# Patient Record
(demographics unavailable — no encounter records)

---

## 2025-01-14 NOTE — PHYSICAL EXAM
[General Appearance - In No Acute Distress] : no acute distress [Normal Conjunctiva] : the conjunctiva exhibited no abnormalities [No Oral Pallor] : no oral pallor [Respiration, Rhythm And Depth] : normal respiratory rhythm and effort [Auscultation Breath Sounds / Voice Sounds] : lungs were clear to auscultation bilaterally [Bowel Sounds] : normal bowel sounds [Abdomen Tenderness] : non-tender [Cyanosis, Localized] : no localized cyanosis [] : no rash [Oriented To Time, Place, And Person] : oriented to person, place, and time [Not Palpable] : not palpable [No Precordial Heave] : no precordial heave was noted [Normal Rate] : normal [Rhythm Regular] : regular [Normal S1] : normal S1 [Normal S2] : normal S2 [No Gallop] : no gallop heard [I] : a grade 1 [2+] : left 2+ [No Abnormalities] : the abdominal aorta was not enlarged and no bruit was heard [No Pitting Edema] : no pitting edema present [FreeTextEntry1] : ambulates with a cane secondary to lumbar spinal stenosis [Apical Thrill] : no thrill palpable at the apex [Click] : no click [Pericardial Rub] : no pericardial rub [Right Carotid Bruit] : no bruit heard over the right carotid [Left Carotid Bruit] : no bruit heard over the left carotid

## 2025-01-14 NOTE — CARDIOLOGY SUMMARY
[de-identified] : 1/14/25 -sinus rhythm at a rate of 68 bpm.  Nonspecific RSR' pattern in leads V1-V2.  Nonspecific diminished voltage in leads V2-V6.  Nonspecific repolarization changes.

## 2025-01-14 NOTE — DISCUSSION/SUMMARY
[EKG obtained to assist in diagnosis and management of assessed problem(s)] : EKG obtained to assist in diagnosis and management of assessed problem(s) [FreeTextEntry1] : Mrs. Nunes has noted resolution of her previously reported symptoms of dyspnea on exertion associated with chest tightness precipitated by walking at a faster pace than usual, especially if she should walk after having consumed a meal, seemingly in response to having utilized Breo inhaler.  Note that follow-up coronary CT angiography performed for further evaluation of the symptoms on 5/30/2024 revealed coronary atherosclerosis without evidence for obstructive disease.  Her cardiac examination today is unremarkable.  Her blood pressure reading today is mildly elevated.  Her electrocardiogram today reveals sinus rhythm with a non-specific RSR' pattern in leads V1-V2, nonspecific diminished voltage in the precordial leads, and nonspecific repolarization changes, essentially unchanged from her previous office tracing, allowing for lead placement variation.  In an effort to more optimally control the patient's blood pressure, I am going to try adding a low dosage of an angiotensin receptor blocking agent, noting that she previously was unable to tolerate irbesartan and valsartan secondary to atypical "side effects".  Previously, she had developed a cough related to lisinopril.  I have electronically prescribed olmesartan 5 mg daily to the patient's pharmacy for her today, and I have asked her to call me if she should have any difficulty tolerating this medication.  I have instructed the patient to continue HCTZ 12.5 mg daily as well for the time being, and her blood pressure will be followed.  I have reviewed the findings of the echocardiogram of 8/11/2023 in detail with the patient today.  I am referring the patient for follow-up echocardiography at this point for reassessment of the degree of mitral regurgitation.  She is going to make arrangements to have this study performed through our office, and I will telephone her to discuss the findings, once the study has been completed.  I have reviewed the findings of the coronary CT angiogram of 5/30/2024 in detail with the patient today.  In view of the finding of coronary arteriosclerosis, I have advised her to continue taking aspirin 81 mg daily.  I again recommended statin therapy, however, she again declined this recommendation, in view of having experienced intolerance to multiple statin agents in the past.  She does not wish to consider injection therapy with Repatha or Praluent at this time.  I have again explained the mechanism of vasovagal syncope to the patient today. The potential triggers of vagally-mediated episodes were explained to the patient, and she was advised to assume a supine position if she suspects one of these episodes is developing. In addition, I have advised patient to endeavor to maintain herself in a well-hydrated state, in an effort to avert these episodes.  I have reviewed the findings of the blood test report of 9/17/2022 in detail with the patient today. 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 Statin therapy was again discussed with her, however, she continues to decline being treated with statin therapy.  The importance of dietary modification (including following a sodium-restricted diet) and weight loss was again emphasized to the patient today.  I find no cardiac contraindication to the patient undergoing the EGD as scheduled on 2/4/2024, under routine sedation and monitoring conditions.  If preferable by the gastroenterologist, Dr. Ibarra, aspirin may be held for up to 1 week prior to the procedure.  I have asked the patient to call me if she should have any questions or problems pertaining to these matters, and especially if she should experience any concerning symptoms.  I have otherwise asked her to return to the office for follow-up cardiac evaluation and blood pressure reassessment in 3 months, provided she remains clinically stable in the interim. Female

## 2025-01-14 NOTE — HISTORY OF PRESENT ILLNESS
[FreeTextEntry1] : Mrs. Reshma Nunes presented to the office today for follow-up cardiac evaluation. I last evaluated the patient in the office on 7/15/2024.  The patient is a 78-year-old female with a history of coronary arteriosclerosis without obstructive disease (coronary CTA 10/26/2022), mild mitral regurgitation, vasovagal syncope, atypical chest discomfort, hypertension, a dyslipidemia, GERD, lumbosacral spinal stenosis, and COVID-19 viral infection (2022 - mild viral syndrome associated with bronchitis, requiring treatment with steroids and albuterol nebulizer therapy), asthmatic bronchitis, and pulmonary nodules.  The patient has been doing well from a cardiac symptomatic standpoint since her previous visit on 7/15/2024.  Specifically, she does not report having experienced chest discomfort in association with her activities.  She has not noted dyspnea on exertion.  She has not noted orthopnea or paroxysmal nocturnal dyspnea.  She has not noted any appreciable change in her usual degree of mild lower extremity swelling.  She has not experienced any episodes of palpitations, presyncope or syncope.  At the time of a previous visit here on 2024, the patient reported having recently been experiencing an increased degree of dyspnea on exertion associated with chest tightness, precipitated by walking at a faster pace than usual, and especially if she should walk after having consumed a meal.  I referred her for follow-up coronary CT angiography, which was performed on 2024, revealing coronary atherosclerosis without obstructive disease.  The patient subsequently followed up with her pulmonologist, Dr. Allen, who recommended Breo inhaler, as well as albuterol inhaler as needed.  The patient initially noted a favorable clinical response to utilizing the Breo inhaler, specifically noting significant improvement in her symptoms of chest tightness and dyspnea on exertion.  She then developed "a sore throat", which resolved after she discontinued the Breo inhaler on her own.  She followed up with Dr. Allen on 2024, at which time her pulmonary function studies were normal and she was clinically improved.  She was advised to remain off of Breo inhaler, and to utilize albuterol inhaler on an as needed basis.  She is going to be following up with the pulmonologist on 2025.  The patient is scheduled to undergo EGD on 2024 for further evaluation of abdominal discomfort and nausea, to be performed by Dr. Sixto Ibarra.  The patient reports having discontinued Toprol-XL 12.5 mg daily on 2023 secondary to having experienced fatigue and depression, which she attributed to this medication.  She has noted improvement in her degree of fatigue and has not been feeling depressed since the Toprol-XL was discontinued.  As far as risk factors for coronary artery disease are concerned, the patient has a history of hypertension. She has a history of a dyslipidemia, however, she has been unable to tolerate therapy with Zocor, secondary to developing discomfort in her hips and lower extremities. She states that she discontinued the Zocor, and the symptoms took approximately one year before resolving. She has declined being started on any other statin agents since that time. She denies having a history of diabetes. She denies a history of cigarette smoking. She does not have a known immediate family history of premature coronary artery disease.  Laboratory studies performed on 2022 revealed cholesterol 232, triglycerides 142, HDL 58, and calculated .  The liver chemistries were normal.  The BUN and creatinine were 11 and 0.69, respectively.  The potassium level was 4.2.  The glucose level was 99.  The hemoglobin and hematocrit were 13.8 and 40.1, respectively.  The TSH level was 1.35.  A  24-hour ambulatory blood pressure monitoring study performed from 10/27/21 - 10/28/21 revealed the average reading to be 132/75 with 36% of systolic readings being in the elevated range and 3% of diastolic readings being in the elevated range.  Coronary CT angiography most recently performed on 2024 revealed the total coronary artery calcium score to be 21(LM= 0, LAD= 4, Circ= 1, RCA= 16).  Minimal to mild disease was noted secondary to mixed plaque involving the proximal portion of the LAD, the mid-portion of the LAD, the distal portion of the LAD, a second diagonal branch of the LAD, the proximal and mid-portions of the circumflex artery, the first obtuse marginal branch of the circumflex artery, and the proximal, mid-portion, and distal portion of the RCA.  Pharmacological nuclear stress testing performed on 10/5/2022 was positive for the inducement of dizziness, nausea, and chest tightness.  The study was negative for the inducement of electrocardiographic evidence of myocardial ischemia or significant arrhythmias.  The cardiac imaging portion of the study revealed normal left ventricular myocardial perfusion and systolic function.  Echocardiography most recently performed on 2023 revealed normal cardiac chamber sizes with mild concentric left ventricular hypertrophy.  Left ventricular systolic function was normal, with an estimated ejection fraction of 60%.  Mild left ventricular diastolic dysfunction was demonstrated.  Mild to moderate mitral regurgitation and mild tricuspid regurgitation were demonstrated, without evidence of pulmonary hypertension.  Carotid artery Doppler testing performed on 1/15/15 did not demonstrate atherosclerosis formation or any significant stenoses.  Previous History:  The patient was vacationing in [place] in 2022.  During a tour of the [place] on a particularly hot and humid day, the patient elected to sit rather than take the tour, as she felt mildly dyspneic, although not to the point where she felt the need to use her albuterol inhaler.  A little later that day, while walking, she noted the development of chest heaviness/tightness associated with dyspnea.  She rested on a bench for a few minutes and these symptoms eased.  She then resumed walking, and the symptoms recurred, again easing within a few minutes of rest.  She continued this pattern until she returned to her hotel, at which point she used her albuterol inhaler and rested for half an hour, and then felt better.  Later that evening, the patient noted dyspnea on exertion in association with walking and climbing stairs again.  Over the next few days in [place], the symptoms did not recur.  She returned home on 9/15/2022 and consulted with her PCP, Dr. Millan, on 2022.  At that point, Singulair was discontinued, and the patient subsequently felt  "much better".  She subsequently noted that her "mind became clearer", her memory improved, and she became less dyspneic.  Note that the patient underwent pharmacological nuclear stress testing on 10/5/2022 in our office, which was positive for the inducement of dizziness, nausea, and vaguely having described chest discomfort.  The study was negative for the development of electrocardiographic evidence of myocardial ischemia or significant arrhythmias.  The cardiac imaging portion of the study revealed normal left ventricular myocardial perfusion and systolic function.  Follow-up coronary CT angiography performed on 10/26/2022 revealed a normal-sized heart with a total calcium score of 5.  Angiography revealed the left main coronary artery to be patent, mixed calcified and noncalcified plaque involving the midportion of the left anterior descending artery with minimal luminal narrowing, a patent left circumflex artery, and a dominant right coronary artery with calcified plaque within the proximal portion resulting in minimal luminal narrowing.  As an incidental finding, pulmonary nodules were noted, for which the patient consulted with a pulmonologist, Dr. Allen, who referred the patient for a follow-up chest CT scan for reassessment (performed on 2023, interpreted as revealing multiple pulmonary nodules with a stable appearance).  The patient presented to the emergency room at [facility] on 2021 for further evaluation of hypertension associated with a headache, possibly related to having recently been treated with a course of steroids for hives.  A CT scan of the head was reportedly unrevealing.  The patient was treated with intravenous labetalol and her blood pressure improved.  She was released from the emergency room.  She telephoned me on 2021, complaining of elevated blood pressure readings at home as well as a headache.  I prescribed labetalol 100 mg twice daily.  She found that the labetalol lowered her blood pressure "too much", and she felt "dizzy".  The labetalol was discontinued on 2021.  The patient called on 2021 asking if she could increase her hydrochlorothiazide from 12.5 mg once daily to twice daily in an effort to better control her blood pressure.  She subsequently discontinued the higher dosage of hydrochlorothiazide after 1 day, secondary to developing dizziness.  At the time of a follow-up visit here on 2021, the patient reported having predominantly been exhibiting elevated blood pressure readings at home in the mornings, for which Toprol-XL 25 mg at bedtime was initiated.  At the time of a follow-up visit on 2021, the patient stated thaThe patient was vacationing in [place] in 2022.  During a tour of the [place] on a particularly hot and humid day, the patient elected to sit rather than take the tour, as she felt mildly dyspneic, although not to the point where she felt the need to use her albuterol inhaler.  A little later that day, while walking, she noted the development of chest heaviness/tightness associated with dyspnea.  She rested on a bench for a few minutes and these symptoms eased.  She then resumed walking, and the symptoms recurred, again easing within a few minutes of rest.  She continued this pattern until she returned to her hotel, at which point she used her albuterol inhaler and rested for half an hour, and then felt better.  Later that evening, the patient noted dyspnea on exertion in association with walking and climbing stairs again.  Over the next few days in [place], the symptoms did not recur.  She returned home on 9/15/2022 and consulted with her PCP, Dr. Millan, on 2022.  At that point, Singulair was discontinued, and the patient subsequently felt  "much better".  She subsequently noted that her "mind became clearer", her memory improved, and she became less dyspneic.  Note that the patient underwent pharmacological nuclear stress testing on 10/5/2022 in our office, which was positive for the inducement of dizziness, nausea, and vaguely having described chest discomfort.  The study was negative for the development of electrocardiographic evidence of myocardial ischemia or significant arrhythmias.  The cardiac imaging portion of the study revealed normal left ventricular myocardial perfusion and systolic function.  Follow-up coronary CT angiography performed on 10/26/2022 revealed a normal-sized heart with a total calcium score of 5.  Angiography revealed the left main coronary artery to be patent, mixed calcified and noncalcified plaque involving the midportion of the left anterior descending artery with minimal luminal narrowing, a patent left circumflex artery, and a dominant right coronary artery with calcified plaque within the proximal portion resulting in minimal luminal narrowing.  As an incidental finding, pulmonary nodules were noted, for which the patient consulted with a pulmonologist, Dr. Allen, who referred the patient for a follow-up chest CT scan for reassessment (performed on 2023, interpreted as revealing multiple pulmonary nodules with a stable appearance).t her morning blood pressure readings had continued to predominantly run in an elevated range, for which the dosage of Toprol-XL was increased to 25 mg twice daily.  The patient stated that she has been feeling fatigued in the afternoon, often needing to take a nap, since the dosage of Toprol-XL was increased, despite the dosage having been changed from Toprol-XL 25 mg twice daily to 50 mg at bedtime at the time of a follow-up visit here on 2021.  At the time of a previous visit here on 18, I instructed the patient to reduce the dosage of HCTZ from 25 mg daily to 12.5 mg every other day, in view of the patient having experienced 3 vagally-mediated syncopal episodes over the preceding several months. She then noted her home blood pressure readings to have been predominantly elevated (120's to 150's over 70's to 90's), as well as an increased degree of lower extremity swelling.  At the time of a follow-up visit here on 18, I instructed her to increase the dosage of HCTZ to 12.5 mg once daily, in an effort to more optimally control her blood pressure. She has been tolerating the increased dosage of HCTZ, and she has not experienced recurrence of presyncope/syncope since the dosage was increased to 12.5 mg once daily.  In 2018, while on a cruise ship, the patient awakened during the night when abdominal discomfort, a sensation of needing to have a bowel movement, and nausea. She went into the bathroom, and promptly experienced a brief syncopal episode. Her  came into the bathroom, and reports that the patient regained consciousness within a minute. The patient vomited and had a bowel movement while on the floor during this period of time. She was brought to the [facility], where she was treated with intravenous hydration and an antiemetic agent. She went back to her room and went to sleep, and felt fine when she awakened the following day. Since that time, she has experienced recurrence of syncope on 2 occasions, with both of these episodes having occurred at night after she developed abdominal discomfort and went to the bathroom to pass a bowel movement. One episode occurred on 18, for which the patient went to the emergency room at [facility] for further evaluation. Her evaluation, including blood testing, chest x-ray, electrocardiogram, and a non-contrast CT scan of the head were all unrevealing. She does report, however, that she was told of having a low blood pressure reading during the emergency room visit. The most recent episode occurred on 18. Fortunately, the patient did not sustain any serious injury with any of these syncopal episodes.  At the time of a previous visit here on 2/23/15, I started the patient on Avapro 150 mg daily, in an effort to control her blood pressure, noting that she previously did not tolerate Diovan secondary to the development of right hip and ankle discomfort and losartan secondary to GI intolerance. Previously, she had developed a cough related to lisinopril, which had been prescribed by her internist. The patient also noted feeling extremely fatigued, which she attributed to the Avapro. The Avapro was discontinued at the time of a follow up visit here on 3/30/50  At the time of a previous visit here on 14, the patient reported experiencing a cough, which I suspected was related to having recently been started by her internist on lisinopril. I instructed her to discontinue the lisinopril, and prescribed losartan as an alternative antihypertensive medication. The patient stated that the cough resolved after the lisinopril was discontinued, however, she telephoned me within a few days, reporting that she was experiencing stomach intolerance to the losartan. I instructed her to discontinue the losartan at that point, and I prescribed valsartan as an alternative antihypertensive medication. She states that she developed discomfort involving the right hip and the right ankle after this medication was initiated, and the medication was discontinued at the time of a follow-up visit here on 1/21/15.  As previously stated, the patient, the patient was started on Avapro on 2/23/15, however, developed fatigue, which she attributed to this medication, and the medication was subsequently discontinued.  Past medical/surgical history according to the patient is significant for a right ankle injury (torn ligament) a few years ago, uterine fibroids resected transvaginally, tubal ligation, 3 pregnancies (one voluntary  and 2 full-term pregnancies delivered vaginally), and laser surgical procedures for treatment of retinal tears.

## 2025-01-23 NOTE — PROCEDURE
[FreeTextEntry1] :  Prior exams reviewed:  ora: normal, improved.   PFT: Normal spirometry.  Lung volumes normal. DLCO normal.  improved from prior.  	 EXAM: [id number] - CT CHEST  - ORDERED BY: LEANA FRANCO   PROCEDURE DATE:  02/05/2024    INTERPRETATION:  Clinical information: Follow-up examination. Exam is compared to cardiac CT scan of 10/26/2022.  CT scan of the chest was obtained without administration of intravenous contrast.  No hilar or mediastinal adenopathy is noted.  Heart is normal in size. No pericardial effusion is noted.  No endobronchial lesions are noted. 0.2 cm nodule in the left lower lobe is unchanged when compared to previous exam. Calcified nodule is noted in the right lower lobe. A few linear opacities representing atelectasis are scattered within both lungs. No pleural effusions are noted.  Below the diaphragm, visualized portions of the abdomen are unremarkable.  Degenerative changes of the spine are noted.  IMPRESSION: Stable tiny nodule in the left lower lobe when compared to previous exam.  --- End of Report ---       ANITHA RYDER MD; Attending Radiologist This document has been electronically signed. Feb 12 2024  1:18PM PFT: Normal spirometry.  Lung volumes normal. DLCO normal.   ct scans reviewed see chart, [facility].

## 2025-01-23 NOTE — HISTORY OF PRESENT ILLNESS
[TextBox_4] : doing well off breo only on prn albuterol and doesn't really need it no resp complaints

## 2025-04-22 NOTE — HISTORY OF PRESENT ILLNESS
[FreeTextEntry1] : Mrs. Reshma Nunes presented to the office today for follow-up cardiac evaluation. I last evaluated the patient in the office on 2025.  The patient is a 78-year-old female with a history of coronary arteriosclerosis without obstructive disease (coronary CTA 10/26/2022), mild mitral regurgitation, vasovagal syncope, atypical chest discomfort, hypertension, a dyslipidemia, GERD, lumbosacral spinal stenosis, and COVID-19 viral infection (2022 - mild viral syndrome associated with bronchitis, requiring treatment with steroids and albuterol nebulizer therapy), asthmatic bronchitis, and pulmonary nodules.  At the time of the patient's previous visit here on 2025, I added olmesartan 5 mg daily in an effort to more optimally control the patient's blood pressure.  She has been tolerating this medication thus far and has noted her blood pressure readings to be normal at a subsequent visit with her pulmonologist on 2025.  She has remained on HCTZ 12.5 mg daily as well for blood pressure management.  The patient has been doing well from a cardiac symptomatic standpoint since her previous visit on 2025.  Specifically, she does not report having experienced chest discomfort in association with her activities.  She has not noted dyspnea on exertion.  She has not noted orthopnea or paroxysmal nocturnal dyspnea.  She has not noted any appreciable change in her usual degree of mild lower extremity swelling.  She has not experienced any episodes of palpitations, presyncope or syncope.  At the time of a previous visit here on 2024, the patient reported having recently been experiencing an increased degree of dyspnea on exertion associated with chest tightness, precipitated by walking at a faster pace than usual, and especially if she should walk after having consumed a meal.  I referred her for follow-up coronary CT angiography, which was performed on 2024, revealing coronary atherosclerosis without obstructive disease.  The patient subsequently followed up with her pulmonologist, Dr. Allen, who recommended Breo inhaler, as well as albuterol inhaler as needed.  The patient initially noted a favorable clinical response to utilizing the Breo inhaler, specifically noting significant improvement in her symptoms of chest tightness and dyspnea on exertion.  She then developed "a sore throat", which resolved after she discontinued the Breo inhaler on her own.  She followed up with Dr. Allen on 2024, at which time her pulmonary function studies were normal and she was clinically improved.  She was advised to remain off of Breo inhaler, and to utilize albuterol inhaler on an as needed basis.  She most recently followed up with the pulmonologist on 2025.  The patient reports having discontinued Toprol-XL 12.5 mg daily on 2023 secondary to having experienced fatigue and depression, which she attributed to this medication.  Review of systems is significant for the patient having recently been suffering from recurrent vertigo.  As far as risk factors for coronary artery disease are concerned, the patient has a history of hypertension. She has a history of a dyslipidemia, however, she has been unable to tolerate therapy with Zocor, secondary to developing discomfort in her hips and lower extremities. She states that she discontinued the Zocor, and the symptoms took approximately one year before resolving. She has declined being started on any other statin agents since that time. She denies having a history of diabetes. She denies a history of cigarette smoking. She does not have a known immediate family history of premature coronary artery disease.  Laboratory studies performed on 2022 revealed cholesterol 232, triglycerides 142, HDL 58, and calculated .  The liver chemistries were normal.  The BUN and creatinine were 11 and 0.69, respectively.  The potassium level was 4.2.  The glucose level was 99.  The hemoglobin and hematocrit were 13.8 and 40.1, respectively.  The TSH level was 1.35.  A  24-hour ambulatory blood pressure monitoring study performed from 10/27/21 - 10/28/21 revealed the average reading to be 132/75 with 36% of systolic readings being in the elevated range and 3% of diastolic readings being in the elevated range.  Coronary CT angiography most recently performed on 2024 revealed the total coronary artery calcium score to be 21(LM= 0, LAD= 4, Circ= 1, RCA= 16).  Minimal to mild disease was noted secondary to mixed plaque involving the proximal portion of the LAD, the mid-portion of the LAD, the distal portion of the LAD, a second diagonal branch of the LAD, the proximal and mid-portions of the circumflex artery, the first obtuse marginal branch of the circumflex artery, and the proximal, mid-portion, and distal portion of the RCA.  Pharmacological nuclear stress testing performed on 10/5/2022 was positive for the inducement of dizziness, nausea, and chest tightness.  The study was negative for the inducement of electrocardiographic evidence of myocardial ischemia or significant arrhythmias.  The cardiac imaging portion of the study revealed normal left ventricular myocardial perfusion and systolic function.  Echocardiography most recently performed on 2025 revealed normal cardiac chamber sizes with normal left ventricular wall thickness.  Left ventricular systolic function was normal, with a calculated ejection fraction of 61%.  Normal left ventricular diastolic function was demonstrated.  Mild to moderate mitral regurgitation was demonstrated.  Mild tricuspid regurgitation was demonstrated, without evidence of pulmonary hypertension.  Carotid artery Doppler testing performed on 1/15/15 did not demonstrate atherosclerosis formation or any significant stenoses.  Previous History:  The patient was vacationing in [place] in 2022.  During a tour of the [place] on a particularly hot and humid day, the patient elected to sit rather than take the tour, as she felt mildly dyspneic, although not to the point where she felt the need to use her albuterol inhaler.  A little later that day, while walking, she noted the development of chest heaviness/tightness associated with dyspnea.  She rested on a bench for a few minutes and these symptoms eased.  She then resumed walking, and the symptoms recurred, again easing within a few minutes of rest.  She continued this pattern until she returned to her hotel, at which point she used her albuterol inhaler and rested for half an hour, and then felt better.  Later that evening, the patient noted dyspnea on exertion in association with walking and climbing stairs again.  Over the next few days in [place], the symptoms did not recur.  She returned home on 9/15/2022 and consulted with her PCP, Dr. Millan, on 2022.  At that point, Singulair was discontinued, and the patient subsequently felt  "much better".  She subsequently noted that her "mind became clearer", her memory improved, and she became less dyspneic.  Note that the patient underwent pharmacological nuclear stress testing on 10/5/2022 in our office, which was positive for the inducement of dizziness, nausea, and vaguely having described chest discomfort.  The study was negative for the development of electrocardiographic evidence of myocardial ischemia or significant arrhythmias.  The cardiac imaging portion of the study revealed normal left ventricular myocardial perfusion and systolic function.  Follow-up coronary CT angiography performed on 10/26/2022 revealed a normal-sized heart with a total calcium score of 5.  Angiography revealed the left main coronary artery to be patent, mixed calcified and noncalcified plaque involving the midportion of the left anterior descending artery with minimal luminal narrowing, a patent left circumflex artery, and a dominant right coronary artery with calcified plaque within the proximal portion resulting in minimal luminal narrowing.  As an incidental finding, pulmonary nodules were noted, for which the patient consulted with a pulmonologist, Dr. Allen, who referred the patient for a follow-up chest CT scan for reassessment (performed on 2023, interpreted as revealing multiple pulmonary nodules with a stable appearance).  The patient presented to the emergency room at [facility] on 2021 for further evaluation of hypertension associated with a headache, possibly related to having recently been treated with a course of steroids for hives.  A CT scan of the head was reportedly unrevealing.  The patient was treated with intravenous labetalol and her blood pressure improved.  She was released from the emergency room.  She telephoned me on 2021, complaining of elevated blood pressure readings at home as well as a headache.  I prescribed labetalol 100 mg twice daily.  She found that the labetalol lowered her blood pressure "too much", and she felt "dizzy".  The labetalol was discontinued on 2021.  The patient called on 2021 asking if she could increase her hydrochlorothiazide from 12.5 mg once daily to twice daily in an effort to better control her blood pressure.  She subsequently discontinued the higher dosage of hydrochlorothiazide after 1 day, secondary to developing dizziness.  At the time of a follow-up visit here on 2021, the patient reported having predominantly been exhibiting elevated blood pressure readings at home in the mornings, for which Toprol-XL 25 mg at bedtime was initiated.  At the time of a follow-up visit on 2021, the patient stated thaThe patient was vacationing in [place] in 2022.  During a tour of the [place] on a particularly hot and humid day, the patient elected to sit rather than take the tour, as she felt mildly dyspneic, although not to the point where she felt the need to use her albuterol inhaler.  A little later that day, while walking, she noted the development of chest heaviness/tightness associated with dyspnea.  She rested on a bench for a few minutes and these symptoms eased.  She then resumed walking, and the symptoms recurred, again easing within a few minutes of rest.  She continued this pattern until she returned to her hotel, at which point she used her albuterol inhaler and rested for half an hour, and then felt better.  Later that evening, the patient noted dyspnea on exertion in association with walking and climbing stairs again.  Over the next few days in [place], the symptoms did not recur.  She returned home on 9/15/2022 and consulted with her PCP, Dr. Millan, on 2022.  At that point, Singulair was discontinued, and the patient subsequently felt  "much better".  She subsequently noted that her "mind became clearer", her memory improved, and she became less dyspneic.  Note that the patient underwent pharmacological nuclear stress testing on 10/5/2022 in our office, which was positive for the inducement of dizziness, nausea, and vaguely having described chest discomfort.  The study was negative for the development of electrocardiographic evidence of myocardial ischemia or significant arrhythmias.  The cardiac imaging portion of the study revealed normal left ventricular myocardial perfusion and systolic function.  Follow-up coronary CT angiography performed on 10/26/2022 revealed a normal-sized heart with a total calcium score of 5.  Angiography revealed the left main coronary artery to be patent, mixed calcified and noncalcified plaque involving the midportion of the left anterior descending artery with minimal luminal narrowing, a patent left circumflex artery, and a dominant right coronary artery with calcified plaque within the proximal portion resulting in minimal luminal narrowing.  As an incidental finding, pulmonary nodules were noted, for which the patient consulted with a pulmonologist, Dr. Allen, who referred the patient for a follow-up chest CT scan for reassessment (performed on 2023, interpreted as revealing multiple pulmonary nodules with a stable appearance).t her morning blood pressure readings had continued to predominantly run in an elevated range, for which the dosage of Toprol-XL was increased to 25 mg twice daily.  The patient stated that she has been feeling fatigued in the afternoon, often needing to take a nap, since the dosage of Toprol-XL was increased, despite the dosage having been changed from Toprol-XL 25 mg twice daily to 50 mg at bedtime at the time of a follow-up visit here on 2021.  At the time of a previous visit here on 18, I instructed the patient to reduce the dosage of HCTZ from 25 mg daily to 12.5 mg every other day, in view of the patient having experienced 3 vagally-mediated syncopal episodes over the preceding several months. She then noted her home blood pressure readings to have been predominantly elevated (120's to 150's over 70's to 90's), as well as an increased degree of lower extremity swelling.  At the time of a follow-up visit here on 18, I instructed her to increase the dosage of HCTZ to 12.5 mg once daily, in an effort to more optimally control her blood pressure. She has been tolerating the increased dosage of HCTZ, and she has not experienced recurrence of presyncope/syncope since the dosage was increased to 12.5 mg once daily.  In 2018, while on a cruise ship, the patient awakened during the night when abdominal discomfort, a sensation of needing to have a bowel movement, and nausea. She went into the bathroom, and promptly experienced a brief syncopal episode. Her  came into the bathroom, and reports that the patient regained consciousness within a minute. The patient vomited and had a bowel movement while on the floor during this period of time. She was brought to the [facility], where she was treated with intravenous hydration and an antiemetic agent. She went back to her room and went to sleep, and felt fine when she awakened the following day. Since that time, she has experienced recurrence of syncope on 2 occasions, with both of these episodes having occurred at night after she developed abdominal discomfort and went to the bathroom to pass a bowel movement. One episode occurred on 18, for which the patient went to the emergency room at [facility] for further evaluation. Her evaluation, including blood testing, chest x-ray, electrocardiogram, and a non-contrast CT scan of the head were all unrevealing. She does report, however, that she was told of having a low blood pressure reading during the emergency room visit. The most recent episode occurred on 18. Fortunately, the patient did not sustain any serious injury with any of these syncopal episodes.  At the time of a previous visit here on 2/23/15, I started the patient on Avapro 150 mg daily, in an effort to control her blood pressure, noting that she previously did not tolerate Diovan secondary to the development of right hip and ankle discomfort and losartan secondary to GI intolerance. Previously, she had developed a cough related to lisinopril, which had been prescribed by her internist. The patient also noted feeling extremely fatigued, which she attributed to the Avapro. The Avapro was discontinued at the time of a follow up visit here on 3/30/50  At the time of a previous visit here on 14, the patient reported experiencing a cough, which I suspected was related to having recently been started by her internist on lisinopril. I instructed her to discontinue the lisinopril, and prescribed losartan as an alternative antihypertensive medication. The patient stated that the cough resolved after the lisinopril was discontinued, however, she telephoned me within a few days, reporting that she was experiencing stomach intolerance to the losartan. I instructed her to discontinue the losartan at that point, and I prescribed valsartan as an alternative antihypertensive medication. She states that she developed discomfort involving the right hip and the right ankle after this medication was initiated, and the medication was discontinued at the time of a follow-up visit here on 1/21/15.  As previously stated, the patient, the patient was started on Avapro on 2/23/15, however, developed fatigue, which she attributed to this medication, and the medication was subsequently discontinued.  Past medical/surgical history according to the patient is significant for a right ankle injury (torn ligament) a few years ago, uterine fibroids resected transvaginally, tubal ligation, 3 pregnancies (one voluntary  and 2 full-term pregnancies delivered vaginally), and laser surgical procedures for treatment of retinal tears.

## 2025-04-22 NOTE — CARDIOLOGY SUMMARY
[de-identified] : 4/22/25 -sinus rhythm at a rate of 87 bpm.  Mild intraventricular conduction delay.  Nonspecific RSR' pattern in leads V1-V3.  Nonspecific diminished voltage in the precordial leads.  Nonspecific repolarization changes.

## 2025-04-22 NOTE — DISCUSSION/SUMMARY
[EKG obtained to assist in diagnosis and management of assessed problem(s)] : EKG obtained to assist in diagnosis and management of assessed problem(s) [FreeTextEntry1] : Mrs. Nunes has noted resolution of her previously reported symptoms of dyspnea on exertion associated with chest tightness precipitated by walking at a faster pace than usual, especially if she should walk after having consumed a meal, seemingly in response to having utilized Breo inhaler.  Note that follow-up coronary CT angiography performed for further evaluation of the symptoms on 5/30/2024 revealed coronary atherosclerosis without evidence for obstructive disease.  Her cardiac examination today is unremarkable.  Her blood pressure reading today is normal.  Her electrocardiogram today reveals sinus rhythm with a mild IVCD, a non-specific RSR' pattern in leads V1-V3, nonspecific diminished voltage in the precordial leads, and nonspecific repolarization changes, essentially unchanged from her previous office tracing, allowing for lead placement variation.  As her blood pressure reading today is normal, I have instructed the patient to continue her antihypertensive therapy as presently prescribed, including olmesartan 5 mg daily and HCTZ 12.5 mg daily.  I have reviewed the findings of the echocardiogram of 1/14/2025 in detail with the patient today.  A follow-up study will be planned for April 2026 for reassessment of the degree of mitral regurgitation.  I have reviewed the findings of the coronary CT angiogram of 5/30/2024 in detail with the patient today.  In view of the finding of coronary arteriosclerosis, I have advised her to continue taking aspirin 81 mg daily.  I again recommended statin therapy, however, she again declined this recommendation, in view of having experienced intolerance to multiple statin agents in the past.  She does not wish to consider injection therapy with Repatha or Praluent at this time.  I have again explained the mechanism of vasovagal syncope to the patient today. The potential triggers of vagally-mediated episodes were explained to the patient, and she was advised to assume a supine position if she suspects one of these episodes is developing. In addition, I have advised patient to endeavor to maintain herself in a well-hydrated state, in an effort to avert these episodes.  I have reviewed the findings of the blood test report of 9/17/2022 in detail with the patient today. 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 Statin therapy was again discussed with her, however, she continues to decline being treated with statin therapy.  She anticipates having a follow-up blood test performed in the near future through the office of her PCP, Dr. Millan, and she will have a copy of the results forwarded to my office for my review.  The importance of dietary modification (including following a sodium-restricted diet) and weight loss was again emphasized to the patient today.  I have recommended to the patient that she be evaluated by an ENT specialist regarding recurrent vertigo.  I have asked the patient to call me if she should have any questions or problems pertaining to these matters, and especially if she should experience any concerning symptoms.  I have otherwise asked her to return to the office for follow-up cardiac evaluation and blood pressure reassessment in 6 months, provided she remains clinically stable in the interim.

## 2025-07-24 NOTE — HISTORY OF PRESENT ILLNESS
[TextBox_4] : has been doing fine from resp standpoint no inhaler use has albuterol at home just in case

## 2025-07-24 NOTE — ASSESSMENT
[FreeTextEntry1] : asthma has been inactive for sometime now has prn albuterol just in case it is possible covid triggered her a few years ago and now recovered  will fu prn

## 2025-07-24 NOTE — PROCEDURE
[FreeTextEntry1] : ora: normal  Prior exams reviewed:  ora: normal, improved.   PFT: Normal spirometry.  Lung volumes normal. DLCO normal.  improved from prior.  	 EXAM: [id number] - CT CHEST  - ORDERED BY: LEANA FRANCO   PROCEDURE DATE:  02/05/2024    INTERPRETATION:  Clinical information: Follow-up examination. Exam is compared to cardiac CT scan of 10/26/2022.  CT scan of the chest was obtained without administration of intravenous contrast.  No hilar or mediastinal adenopathy is noted.  Heart is normal in size. No pericardial effusion is noted.  No endobronchial lesions are noted. 0.2 cm nodule in the left lower lobe is unchanged when compared to previous exam. Calcified nodule is noted in the right lower lobe. A few linear opacities representing atelectasis are scattered within both lungs. No pleural effusions are noted.  Below the diaphragm, visualized portions of the abdomen are unremarkable.  Degenerative changes of the spine are noted.  IMPRESSION: Stable tiny nodule in the left lower lobe when compared to previous exam.  --- End of Report ---       ANITHA RYDER MD; Attending Radiologist This document has been electronically signed. Feb 12 2024  1:18PM PFT: Normal spirometry.  Lung volumes normal. DLCO normal.   ct scans reviewed see chart, [facility].